# Patient Record
Sex: FEMALE | Race: WHITE | URBAN - METROPOLITAN AREA
[De-identification: names, ages, dates, MRNs, and addresses within clinical notes are randomized per-mention and may not be internally consistent; named-entity substitution may affect disease eponyms.]

---

## 2024-07-18 ENCOUNTER — OFFICE VISIT (OUTPATIENT)
Dept: NEUROLOGY | Facility: CLINIC | Age: 39
End: 2024-07-18
Payer: COMMERCIAL

## 2024-07-18 VITALS
WEIGHT: 151.38 LBS | BODY MASS INDEX: 24 KG/M2 | HEART RATE: 103 BPM | DIASTOLIC BLOOD PRESSURE: 118 MMHG | RESPIRATION RATE: 16 BRPM | SYSTOLIC BLOOD PRESSURE: 162 MMHG

## 2024-07-18 DIAGNOSIS — G43.109 MIGRAINE WITH AURA AND WITHOUT STATUS MIGRAINOSUS, NOT INTRACTABLE: Primary | ICD-10-CM

## 2024-07-18 PROCEDURE — 99204 OFFICE O/P NEW MOD 45 MIN: CPT | Performed by: OTHER

## 2024-07-18 RX ORDER — VALACYCLOVIR HYDROCHLORIDE 500 MG/1
500 TABLET, FILM COATED ORAL DAILY
COMMUNITY
Start: 2024-05-02

## 2024-07-18 RX ORDER — PREDNISONE 20 MG/1
TABLET ORAL
Qty: 12 TABLET | Refills: 0 | Status: SHIPPED | OUTPATIENT
Start: 2024-07-18

## 2024-07-18 RX ORDER — NAPROXEN 500 MG/1
500 TABLET ORAL 2 TIMES DAILY WITH MEALS
COMMUNITY
Start: 2024-07-11

## 2024-07-18 RX ORDER — MEDROXYPROGESTERONE ACETATE 150 MG/ML
150 INJECTION, SUSPENSION INTRAMUSCULAR
COMMUNITY

## 2024-07-18 NOTE — PATIENT INSTRUCTIONS
Refill policies:    Allow 2-3 business days for refills; controlled substances may take longer.  Contact your pharmacy at least 5 days prior to running out of medication and have them send an electronic request or submit request through the “request refill” option in your [facility] account.  Refills are not addressed on weekends; covering physicians do not authorize routine medications on weekends.  No narcotics or controlled substances are refilled after noon on Fridays or by on call physicians.  By law, narcotics must be electronically prescribed.  A 30 day supply with no refills is the maximum allowed.  If your prescription is due for a refill, you may be due for a follow up appointment.  To best provide you care, patients receiving routine medications need to be seen at least once a year.  Patients receiving narcotic/controlled substance medications need to be seen at least once every 3 months.  In the event that your preferred pharmacy does not have the requested medication in stock (e.g. Backordered), it is your responsibility to find another pharmacy that has the requested medication available.  We will gladly send a new prescription to that pharmacy at your request.    Scheduling Tests:    If your physician has ordered radiology tests such as MRI or CT scans, please contact Central Scheduling at [phone] right away to schedule the test.  Once scheduled, the [facility] Centralized Referral Team will work with your insurance carrier to obtain pre-certification or prior authorization.  Depending on your insurance carrier, approval may take 3-10 days.  It is highly recommended patients assure they have received an authorization before having a test performed.  If test is done without insurance authorization, patient may be responsible for the entire amount billed.      Precertification and Prior Authorizations:  If your physician has recommended that you have a procedure or additional testing performed the [facility]  Centralized Referral Team will contact your insurance carrier to obtain pre-certification or prior authorization.    You are strongly encouraged to contact your insurance carrier to verify that your procedure/test has been approved and is a COVERED benefit.  Although the [facility] Centralized Referral Team does its due diligence, the insurance carrier gives the disclaimer that \"Although the procedure is authorized, this does not guarantee payment.\"    Ultimately the patient is responsible for payment.   Thank you for your understanding in this matter.  Paperwork Completion:  If you require FMLA or disability paperwork for your recovery, please make sure to either drop it off or have it faxed to our office at [phone]. Be sure the form has your name and date of birth on it.  The form will be faxed to our Forms Department and they will complete it for you.  There is a 25$ fee for all forms that need to be filled out.  Please be aware there is a 10-14 day turnaround time.  You will need to sign a release of information (CHAPIS) form if your paperwork does not come with one.  You may call the Forms Department with any questions at [phone].  Their fax number is [phone].

## 2024-07-18 NOTE — PROGRESS NOTES
Neurology History & Physical     ASSESSMENT & PLAN:      ICD-10-CM    1. Migraine with aura and without status migrainosus, not intractable  G43.109         Migraines, uncontrolled.  No indication for neuroimaging.  Continue TPM 25 mg daily, will not increase due to side effects.  Start prednisone.  Consider Magnesium in future.  She is on depo-provera and not planning for more children.    We discussed medication side effects and activity precautions. We discussed symptoms that would warrant urgent/emergent evaluation. Patient verbalized understanding and agreement.    Return in about 2 months (around 9/18/2024).       ~~~~~~~~~~~~~~~~~~~~~~~~~~~    CHIEF COMPLAINT / REASON FOR VISIT:    Chief Complaint   Patient presents with    Migraine     Pt states she is here due to the history of migraines. Pt states blurry vision and left sided numbness when she has them      HISTORY OBTAINED FROM:  Patient and others as above  Chart review    HISTORY:  Kimberly Izaguirre is a 39 year old female with migraines since high school, got worse around 2016.  Does not necessarily get headaches each month, but had lingering headaches in June 2024 and July 2024.  Last bad period of headaches was summer 2022.  She also gets left sided numbness with severe headaches since 2016 (and one event assoc with aphasia).  +p/p, no n/v.  No change in headaches over the past few years.  Can be bioccipital or bilateral retro-orbital.  The most recent two headache events are only different in that they are lingering over several days.    Currently on TPM prophylaxis (since 2016), recently added naproxen PRN since last week.  Taking it Q6H initially, has weaned off it two days ago because it is not helping.  Tolerating TPM well, but higher doses caused altered taste for all food, this was intolerable.    Previous medication trials:  Prednisone very frequently but that was > 10 years ago    DATA REVIEWED:  As documented in the history    PHYSICAL  EXAMINATION:  BP (!) 162/118   Pulse 103   Resp 16   Wt 151 lb 6.4 oz (68.7 kg)   BMI 23.71 kg/m²     Gen: in NAD  MSE: AAOx3, nl language, nl attn/conc, nl fund of knowledge  CN: PERRL, VFF; EOMI, no nystagmus; nl facial mvmt bilaterally; nl hearing bilaterally; nl palate elevation bilaterally; nl voice; nl shoulder shrug b/I; nl tongue movement  Motor: 5/5 x4; no drift; normal tone; no abnormal movements  Sensory: nl vibration x4  Coord: nl FTN b/I  Reflex: 2+ BUE and BLE  Gait: normal    No Known Allergies    Current medications:   Rosuvastatin Calcium 5 MG Oral Capsule Sprinkle Take 5 mg by mouth daily.      naproxen 500 MG Oral Tab Take 1 tablet (500 mg total) by mouth 2 (two) times daily with meals.      medroxyPROGESTERone 150 MG/ML Intramuscular Suspension Inject 1 mL (150 mg total) into the muscle every 3 (three) months.      valACYclovir 500 MG Oral Tab Take 1 tablet (500 mg total) by mouth daily.      predniSONE 20 MG Oral Tab Take 3 tabs daily for 2 days, then 2 tabs daily for 2 days, then 1 tab daily for 2 days 12 tablet 0    TOPIRAMATE 25 MG Oral Tab TAKE 1 TABLET ONCE DAILY 90 tablet 0       Past Medical History:    Anxiety    Chronic migraine       Past Surgical History:   Procedure Laterality Date    [place] teeth removed         Social History     Socioeconomic History    Marital status:     Number of children: 0   Occupational History    Occupation: marketing   Tobacco Use    Smoking status: Former     Current packs/day: 0.50     Average packs/day: 0.5 packs/day for 3.0 years (1.5 ttl pk-yrs)     Types: Cigarettes    Smokeless tobacco: Never   Vaping Use    Vaping status: Never Used   Substance and Sexual Activity    Alcohol use: Yes     Alcohol/week: 6.0 standard drinks of alcohol     Types: 6 Standard drinks or equivalent per week     Comment: occasional    Drug use: No    Sexual activity: Yes     Partners: Male   Other Topics Concern    Caffeine Concern Yes     Comment: occ     Exercise No    Seat Belt Yes       Family History   Problem Relation Age of Onset    Heart Disorder Father         arrythmia    Thyroid Disorder Mother     Blood Disorder Mother         hx of tia / thrombosis. on coumadin    Diabetes Maternal Grandfather     Diabetes Paternal Grandmother     Colon Cancer Neg     Breast Cancer Neg          Gilmar Yuan MD, FAES, FAAN  Board-Certified in Neurology, Epilepsy, and Clinical Neurophysiology  [facility] [place]

## 2024-07-25 ENCOUNTER — TELEPHONE (OUTPATIENT)
Dept: NEUROLOGY | Facility: CLINIC | Age: 39
End: 2024-07-25

## 2024-07-25 NOTE — TELEPHONE ENCOUNTER
Patient states that she is taking most recent Rx, but is still needing Aleve to find relief from migraines.   She thinks she had taken Methylprednisolone prior that worked best for her, did not share this info with provider at last visit.       Routed to nurses

## 2024-07-26 ENCOUNTER — PATIENT MESSAGE (OUTPATIENT)
Dept: NEUROLOGY | Facility: CLINIC | Age: 39
End: 2024-07-26

## 2024-07-26 NOTE — TELEPHONE ENCOUNTER
From: Kimberly Izaguirre  To: Gilmar Yuan  Sent: 7/26/2024 8:13 AM CDT  Subject: Still having migraine pain    I called the office yesterday and left message, just following up. Since taking the prednisone pain started to lessen but has not gone away. Realized when picking up the meds I told you the wrong steroid, previously I've taken methylprednisolone - similar so kept with it, not sure if that's why it's not going away. Finished prednisone Tuesday. Now up to 3 Aleve/day to maintain migraine pain/pressure. Can I get a refill on the prednisone or try the methylprednisolone to knock this out/break this cycle?

## 2024-09-20 ENCOUNTER — OFFICE VISIT (OUTPATIENT)
Dept: NEUROLOGY | Facility: CLINIC | Age: 39
End: 2024-09-20
Payer: COMMERCIAL

## 2024-09-20 VITALS
BODY MASS INDEX: 23 KG/M2 | DIASTOLIC BLOOD PRESSURE: 70 MMHG | WEIGHT: 150 LBS | HEART RATE: 85 BPM | SYSTOLIC BLOOD PRESSURE: 122 MMHG | RESPIRATION RATE: 16 BRPM

## 2024-09-20 DIAGNOSIS — G43.709 CHRONIC MIGRAINE WITHOUT AURA WITHOUT STATUS MIGRAINOSUS, NOT INTRACTABLE: ICD-10-CM

## 2024-09-20 PROCEDURE — 99214 OFFICE O/P EST MOD 30 MIN: CPT | Performed by: OTHER

## 2024-09-20 RX ORDER — TOPIRAMATE 25 MG/1
50 TABLET, FILM COATED ORAL NIGHTLY
Qty: 60 TABLET | Refills: 11 | Status: SHIPPED | OUTPATIENT
Start: 2024-09-20

## 2024-09-20 RX ORDER — SUMATRIPTAN 25 MG/1
TABLET, FILM COATED ORAL
Qty: 9 TABLET | Refills: 11 | Status: SHIPPED | OUTPATIENT
Start: 2024-09-20

## 2024-09-20 NOTE — PROGRESS NOTES
Neurology History & Physical     ASSESSMENT & PLAN:      ICD-10-CM    1. Chronic migraine without aura without status migrainosus, not intractable  G43.709 topiramate 25 MG Oral Tab        Migraines, uncontrolled.  No indication for neuroimaging.  Increase TPM to 50 mg daily, if not tolerated (daniela bad taste) in 2-3 weeks, will consider propranolol as next option.  I advised to stop tylenol, but continue Magnesium + B2 (may stop at next visit if no benefit).  Wrote for imitrex PRN.    She is on depo-provera and not planning for more children.    We discussed medication side effects including of propranolol. Patient verbalized understanding and agreement.    Return in about 3 months (around 12/20/2024).       ~~~~~~~~~~~~~~~~~~~~~~~~~~~    CHIEF COMPLAINT / REASON FOR VISIT:    Chief Complaint   Patient presents with    Neurologic Problem    Migraine     Patient states 2 severe migraines since last office visit.      HISTORY OBTAINED FROM:  Patient and others as above  Chart review    HISTORY:  Kimberly Izaguirre is a 39 year old female with migraines since high school, got worse around 2016.  Does not necessarily get headaches each month, but had lingering headaches in June 2024 and July 2024.  Last bad period of headaches was summer 2022.  She also gets left sided numbness with severe headaches since 2016 (and one event assoc with aphasia).  +p/p, no n/v.  No change in headaches over the past few years.  Can be bioccipital or bilateral retro-orbital.  The most recent two headache events are only different in that they are lingering over several days.    Currently on TPM prophylaxis (since 2016), recently added naproxen PRN since last week.  Taking it Q6H initially, has weaned off it two days ago because it is not helping.  Tolerating TPM well, but higher doses caused altered taste for all food, this was intolerable.    Previous medication trials:  Prednisone very frequently but that was > 10 years ago  Aleve -  ineffective  Tylenol - ineffective    INTERIM HISTORY:  Continues to have nearly daily headaches, had 2 bad migraines in past 2 months.  Takes Tylenol 5-6 days a month, partially effective.  Has done two courses of steroids, partially effective.  TPM without side effects, but only on 25 mg nightly (higher dose made everything taste bad).  She is taking Magnesium + B2 nearly two months.    She complains of chronic neck (occipital) and shoulder pain.  This triggers migraines, she does see chiropractor frequently.  Does have numbness in RUE but that is during severe headaches only, and assoc with RLE and tongue numbness.    DATA REVIEWED:  As documented in the history    PHYSICAL EXAMINATION:  /70   Pulse 85   Resp 16   Wt 150 lb (68 kg)   BMI 23.49 kg/m²     Gen: in NAD  MSE: nl attn/conc, nl language, nl fund of knowledge  CN: EOMI, VFF, nl facial mvmt, nl palate, nl tongue  Motor: 5/5 x4  DTR: 2+ BUE and BLE  Coord: nl FTN b/I  Gait: normal    No Known Allergies    Current medications:   SUMAtriptan 25 MG Oral Tab Take 1 tab at start of headache, may repeat 1 more tab in 2 hours, no more than 2 in 24 hours or 3 in 1 week. This is a 30 day supply. 9 tablet 11    topiramate 25 MG Oral Tab Take 2 tablets (50 mg total) by mouth at bedtime. 60 tablet 11    Rosuvastatin Calcium 5 MG Oral Capsule Sprinkle Take 5 mg by mouth daily.      naproxen 500 MG Oral Tab Take 1 tablet (500 mg total) by mouth 2 (two) times daily with meals.      medroxyPROGESTERone 150 MG/ML Intramuscular Suspension Inject 1 mL (150 mg total) into the muscle every 3 (three) months.      valACYclovir 500 MG Oral Tab Take 1 tablet (500 mg total) by mouth daily.         Past Medical History:    Anxiety    Chronic migraine       Past Surgical History:   Procedure Laterality Date    [place] teeth removed         Social History     Socioeconomic History    Marital status:     Number of children: 0   Occupational History    Occupation:  marketing   Tobacco Use    Smoking status: Former     Current packs/day: 0.50     Average packs/day: 0.5 packs/day for 3.0 years (1.5 ttl pk-yrs)     Types: Cigarettes    Smokeless tobacco: Never   Vaping Use    Vaping status: Never Used   Substance and Sexual Activity    Alcohol use: Yes     Alcohol/week: 6.0 standard drinks of alcohol     Types: 6 Standard drinks or equivalent per week     Comment: occasional    Drug use: No    Sexual activity: Yes     Partners: Male   Other Topics Concern    Caffeine Concern Yes     Comment: rarely    Exercise No    Seat Belt Yes       Family History   Problem Relation Age of Onset    Heart Disorder Father         arrythmia    Thyroid Disorder Mother     Blood Disorder Mother         hx of tia / thrombosis. on coumadin    Diabetes Maternal Grandfather     Diabetes Paternal Grandmother     Colon Cancer Neg     Breast Cancer Neg          Gilmar Yuan MD, FAES, FAAN  Board-Certified in Neurology, Epilepsy, and Clinical Neurophysiology  [facility] [place]

## 2024-09-20 NOTE — PATIENT INSTRUCTIONS
Refill policies:    Allow 2-3 business days for refills; controlled substances may take longer.  Contact your pharmacy at least 5 days prior to running out of medication and have them send an electronic request or submit request through the “request refill” option in your [employer] account.  Refills are not addressed on weekends; covering physicians do not authorize routine medications on weekends.  No narcotics or controlled substances are refilled after noon on Fridays or by on call physicians.  By law, narcotics must be electronically prescribed.  A 30 day supply with no refills is the maximum allowed.  If your prescription is due for a refill, you may be due for a follow up appointment.  To best provide you care, patients receiving routine medications need to be seen at least once a year.  Patients receiving narcotic/controlled substance medications need to be seen at least once every 3 months.  In the event that your preferred pharmacy does not have the requested medication in stock (e.g. Backordered), it is your responsibility to find another pharmacy that has the requested medication available.  We will gladly send a new prescription to that pharmacy at your request.    Scheduling Tests:    If your physician has ordered radiology tests such as MRI or CT scans, please contact Central Scheduling at [phone] right away to schedule the test.  Once scheduled, the [facility] Centralized Referral Team will work with your insurance carrier to obtain pre-certification or prior authorization.  Depending on your insurance carrier, approval may take 3-10 days.  It is highly recommended patients assure they have received an authorization before having a test performed.  If test is done without insurance authorization, patient may be responsible for the entire amount billed.      Precertification and Prior Authorizations:  If your physician has recommended that you have a procedure or additional testing performed the [facility]  Centralized Referral Team will contact your insurance carrier to obtain pre-certification or prior authorization.    You are strongly encouraged to contact your insurance carrier to verify that your procedure/test has been approved and is a COVERED benefit.  Although the [facility] Centralized Referral Team does its due diligence, the insurance carrier gives the disclaimer that \"Although the procedure is authorized, this does not guarantee payment.\"    Ultimately the patient is responsible for payment.   Thank you for your understanding in this matter.  Paperwork Completion:  If you require FMLA or disability paperwork for your recovery, please make sure to either drop it off or have it faxed to our office at [phone]. Be sure the form has your name and date of birth on it.  The form will be faxed to our Forms Department and they will complete it for you.  There is a 25$ fee for all forms that need to be filled out.  Please be aware there is a 10-14 day turnaround time.  You will need to sign a release of information (CHAPIS) form if your paperwork does not come with one.  You may call the Forms Department with any questions at [phone].  Their fax number is [phone].

## 2024-10-09 ENCOUNTER — TELEPHONE (OUTPATIENT)
Dept: NEUROLOGY | Facility: CLINIC | Age: 39
End: 2024-10-09

## 2024-10-09 DIAGNOSIS — G43.709 CHRONIC MIGRAINE WITHOUT AURA WITHOUT STATUS MIGRAINOSUS, NOT INTRACTABLE: ICD-10-CM

## 2024-10-09 RX ORDER — TOPIRAMATE 25 MG/1
TABLET, FILM COATED ORAL
COMMUNITY
Start: 2024-10-09

## 2024-10-09 NOTE — TELEPHONE ENCOUNTER
Provider requested Pt provide update after medication change. Pt has taken SUMAtriptan 25 MG Oral Tab for four days without any noticeable improvement, for two of those days she had to take a double dose without a break in pain. She is in pain every day. Please advise, Pt's best cb #[phone]. Endorsed to RN for Provider.

## 2024-10-09 NOTE — TELEPHONE ENCOUNTER
Patient reports no improvement in headaches since last office visit. Has also noticed more sensitivity to light most days. Increased topiramate 25mg to 50mg and has tried sumatriptan 4 days without any relief or change in pain. Still taking vitamins. Reports one day without pain since last visit.       9/20/24 Office visit:   Migraines, uncontrolled.  No indication for neuroimaging.  Increase TPM to 50 mg daily, if not tolerated (daniela bad taste) in 2-3 weeks, will consider propranolol as next option.  I advised to stop tylenol, but continue Magnesium + B2 (may stop at next visit if no benefit).  Wrote for imitrex PRN.

## 2024-10-09 NOTE — TELEPHONE ENCOUNTER
Patient notified of Dr Yuan's instructions. Updated medication list. Does not need a refill now.    Per Gilmar Oconnell MD to [facility] [place] Nurse       10/9/24  2:28 PM  If tolerating higher topiramate dose of 50 mg daily, I recommend increasing to 75mg daily for 2 weeks, then to 100 mg daily.  If not tolerating (had severe bad taste in past), we should start propranolol 10 mg BID x 1 week, then 20 mg BID x 1 week, then 30 mg BID x 1 week, then 40 mg BID.    Thanks    Gilmar

## 2024-11-20 ENCOUNTER — PATIENT MESSAGE (OUTPATIENT)
Dept: NEUROLOGY | Facility: CLINIC | Age: 39
End: 2024-11-20

## 2024-11-20 DIAGNOSIS — G43.709 CHRONIC MIGRAINE WITHOUT AURA WITHOUT STATUS MIGRAINOSUS, NOT INTRACTABLE: ICD-10-CM

## 2024-11-21 RX ORDER — TOPIRAMATE 100 MG/1
100 TABLET, FILM COATED ORAL DAILY
Qty: 90 TABLET | Refills: 0 | Status: SHIPPED | OUTPATIENT
Start: 2024-11-21 | End: 2024-11-21

## 2024-11-21 RX ORDER — TOPIRAMATE 100 MG/1
100 TABLET, FILM COATED ORAL DAILY
Qty: 90 TABLET | Refills: 0 | Status: SHIPPED | OUTPATIENT
Start: 2024-11-21

## 2024-11-21 NOTE — TELEPHONE ENCOUNTER
Per prescription on 10/9/24 increased up to 75 mg for 2 weeks then 100 mg daily.    Patient is out of medication and requesting a refill.

## 2024-11-21 NOTE — TELEPHONE ENCOUNTER
Patient is calling requesting topiramate 100 MG Oral Tab to be send to   [id number] - [place], [place] - [address] [phone], [phone] [address] [facility], [facility] [address]   . Patient stated she will be out of medication soon.